# Patient Record
(demographics unavailable — no encounter records)

---

## 2024-10-23 NOTE — HISTORY OF PRESENT ILLNESS
[FreeTextEntry1] : NICOLASA VASQUES is a 68 year old male with PMH of HTN, HLD, DM, RBBB (s/p negative cardiac w/u this year), presenting today for colon cancer screening due to a personal history of colon polyps. His last colonoscopy was about 5 years ago with a Dr. Covarrubias in [place] and was allegedly normal. He had several prior colonoscopies with Dr. Peña as well and had polyps on a few of those. The reports and pathology are not currently available for review. He denies any family history of colon cancer. He feels well and offers no complaints. He denies any abdominal pain, bowel changes, rectal bleeding. He moves his bowels daily. No upper GI complaints. Currently on Rybelsus for diabetes.  Reports that he had labs last week with his PCP but has not received the results yet.

## 2024-10-23 NOTE — ASSESSMENT
[FreeTextEntry1] : 68 year old male with personal history of colon polyps presenting for colon cancer surveillance. He will be scheduled for a colonoscopy with Suprep for further evaluation. I have discussed the indications (including but not limited to ruling out inflammatory bowel disease, colorectal neoplasm, GI bleed, and AVM's), benefits, risks  (including but not limited to reaction to the anesthesia, infection, bleeding, missed lesions, and perforation),  and alternatives to colonoscopy with the patient. The patient understands all options and has agreed to have a colonoscopy and is medically optimized for the planned procedure.   Obtain recent labs from PCP Dr. Moffett Obtain records and pathology from both Dr. Peña and Dr. Marci Khanbelsus for one week prior

## 2024-10-23 NOTE — END OF VISIT
[FreeTextEntry3] : I, Dr. Anton, personally performed the evaluation and management (E/M) services for this new patient.  That E/M includes conducting the clinically appropriate initial history and/or exam, assessing all conditions, and establishing the plan of care.  Today my LAWANDA, Misty Salter,  was here to observe my evaluation and management services for this patient and follow-up plan of care established by me going forward.

## 2024-10-23 NOTE — ADDENDUM
[FreeTextEntry1] : I, Misty Salter NP, acted as scribe for Dr. Jaron Anton for this patient encounter

## 2024-10-23 NOTE — PHYSICAL EXAM

## 2024-11-17 NOTE — ASSESSMENT
[FreeTextEntry1] : Discussed biopsy results with patient- negative for carcinoma. Repeat PSA in 6 months. Next follow-up appointment in 6 months with Dr. Connolly All questions answered patient agreeable with plan. 20-minute discussion for prostate cancer follow-up and review of labs.

## 2024-11-17 NOTE — HISTORY OF PRESENT ILLNESS
[FreeTextEntry1] : 67 yo male presents today for a follow-up appointment for his prostate biopsy results. Fusion transperineal prostate biopsy performed on 10.9.24. Pt. tolerated the procedure well and is w/o complications.  Recent PSA: 6.04 (6.12.24) Free PSA: 24% (6.12.24)  MRI prostate w/wo contrast (8.2.24) Lesion #1 - PIRADS 4 Lesion #2 - PIRADS 3 PV: 110cc PSAD: 0.05  Here to review his pathology report and develop a plan of care.  Pathology report: Negative for carcinoma. HGPIN x1 core.

## 2024-11-17 NOTE — ADDENDUM
[FreeTextEntry1] : I, Gunjan Damon assisted in documentation on 11/13/2024 at acting as a scribe for and in the presence of Dr. Harvinder Mason.

## 2024-12-20 NOTE — HISTORY OF PRESENT ILLNESS
[FreeTextEntry1] : Patient has arrived for the colonoscopy. No complaints. Doing well. Last colonoscopy was 5 years ago.

## 2024-12-20 NOTE — PHYSICAL EXAM

## 2025-04-16 NOTE — ASSESSMENT
[FreeTextEntry1] :  plan: - new PSA - will update over the phone - if stable, will repeat PSA in 4-6 months - if higher than before, then repeat PSA in 1 month

## 2025-04-16 NOTE — HISTORY OF PRESENT ILLNESS
[FreeTextEntry1] : 70 yo M for follow up with elevated PSA see last note since saw the [facility] team and Dr. Mason had prostate biopsies - no malignancy due to 6 months PSA test now  no voiding complains, no need for any treatment   plan: - new PSA - will update over the phone - if stable, will repeat PSA in 4-6 months - if higher than before, then repeat PSA in 1 month

## 2025-05-27 NOTE — HISTORY OF PRESENT ILLNESS
[FreeTextEntry1] : The patient is a 69-year-old white male with past medical history remarkable for hypertension, hypercholesterolemia, diabetes, and an abnormal ECG/complete right bundle branch block The patient does not exercise.  He is chest pain and dyspnea free.

## 2025-05-27 NOTE — CARDIOLOGY SUMMARY
[de-identified] : Normal sinus rhythm, complete right bundle branch block, left axis deviation, poor R wave progression, no significant change from prior [de-identified] : - Hypertension - abnormal ECG: Right bundle branch block 4/2/2024 - Diabetes: Not on statin therapy due to reported elevated LFTs  - ECHOCARDIOGRAM: 4/15/2024, EF >65%, trace tricuspid regurgitation PA systolic 13  - CAROTID ULTRASOUND: 12/8/2021, normal -NUCLEAR STRESS TEST: 4/19/2024, normal, EF >65%, average exercise tolerance, [facility] 7

## 2025-05-27 NOTE — PHYSICAL EXAM
[Well Developed] : well developed [Well Nourished] : well nourished [No Acute Distress] : no acute distress [Normal Conjunctiva] : normal conjunctiva [Normal Venous Pressure] : normal venous pressure [No Carotid Bruit] : no carotid bruit [Normal S1, S2] : normal S1, S2 [No Rub] : no rub [No Gallop] : no gallop [Clear Lung Fields] : clear lung fields [Good Air Entry] : good air entry [No Respiratory Distress] : no respiratory distress  [Soft] : abdomen soft [Non Tender] : non-tender [No Masses/organomegaly] : no masses/organomegaly [Normal Bowel Sounds] : normal bowel sounds [Normal Gait] : normal gait [No Edema] : no edema [No Cyanosis] : no cyanosis [No Clubbing] : no clubbing [No Varicosities] : no varicosities [No Rash] : no rash [No Skin Lesions] : no skin lesions [Moves all extremities] : moves all extremities [No Focal Deficits] : no focal deficits [Normal Speech] : normal speech [Alert and Oriented] : alert and oriented [Normal memory] : normal memory [de-identified] : 1/6 systolic murmur at the base

## 2025-05-27 NOTE — DISCUSSION/SUMMARY
[EKG obtained to assist in diagnosis and management of assessed problem(s)] : EKG obtained to assist in diagnosis and management of assessed problem(s) [FreeTextEntry1] : Abnormal ECG: Complete right bundle branch block, onset 4/2/2024  Normal LV function by echocardiography and status post normal nuclear stress test 4/2024  Hypertension Controlled.  Continue medical therapy  Diabetes In view of the patient's diabetes and the cholesterol guidelines, the patient would benefit from statin therapy; however, the patient reports elevated LFTs with statin therapy We rediscussed diet, exercise, and weight management  RTO 1 year

## 2025-06-11 NOTE — HISTORY OF PRESENT ILLNESS
[FreeTextEntry1] : The patient was seen by me in the fall of last year in order to schedule a surveillance colonoscopy which was subsequently performed by Dr. Patterson in late December 2024.  There was a 3 mm polyp in the transverse colon which was removed with a cold forceps.  There was several 4 to 5 mm polyps in the transverse colon that were all removed with cold snare.  The polyps were either sessile serrated  or simply adenomas.  There was a 3 mm polyp in the descending colon that was removed and was hyperplastic.  There was severe universal diverticulosis and moderate-sized  internal hemorrhoids.   He returns today noting that he has a prior history of recurrent heartburn for which he had been on antacids for a period of time after which the symptom became much less frequent.  More recently the heartburn has been occurring more frequently averaging twice weekly and usually occurs after the ingestion of wine or coffee.  There is no nausea or vomiting.  There is no dysphagia.  His appetite and weight are stable.'s he takes famotidine as needed with relief.  He underwent an endoscopy many years ago that he believes was unremarkable but is not altogether certain.  He had recent blood test performed by his PCP the results of which are currently unavailable for my review.

## 2025-06-11 NOTE — ASSESSMENT
[FreeTextEntry1] : The patient should undergo a follow-up colonoscopy in 3 years.  In the meantime he will be scheduled for follow-up endoscopy to rule out esophagitis or Gamboa's esophagus.  He will continue to take famotidine as needed.  He was advised to adhere to a low-fat diet and avoid chocolate, peppermint as well as alcohol.  I will try to obtain the results of his prior labs for my review. The risks, benefits, complications and possible adverse consequences associated with upper endoscopy were discussed with the patient.

## 2025-06-11 NOTE — PHYSICAL EXAM
[Normal Voice/Communication] : normal voice/communication [Alert] : alert [Healthy Appearing] : healthy appearing [No Acute Distress] : no acute distress [Sclera] : the sclera and conjunctiva were normal [Hearing Threshold Finger Rub Not Coleman] : hearing was normal [Normal Lips/Gums] : the lips and gums were normal [Oropharynx] : the oropharynx was normal [Normal Appearance] : the appearance of the neck was normal [No Respiratory Distress] : no respiratory distress [No Acc Muscle Use] : no accessory muscle use [Respiration, Rhythm And Depth] : normal respiratory rhythm and effort [Heart Rate And Rhythm] : heart rate was normal and rhythm regular [Abdomen Tenderness] : non-tender [Bowel Sounds] : normal bowel sounds [No Masses] : no abdominal mass palpated [] : no hepatosplenomegaly [Abdomen Soft] : soft [Oriented To Time, Place, And Person] : oriented to person, place, and time [Auscultation Breath Sounds / Voice Sounds] : lungs were clear to auscultation bilaterally [Murmurs] : no murmurs [Normal S1, S2] : normal S1 and S2